# Patient Record
Sex: MALE
[De-identification: names, ages, dates, MRNs, and addresses within clinical notes are randomized per-mention and may not be internally consistent; named-entity substitution may affect disease eponyms.]

---

## 2017-09-15 ENCOUNTER — APPOINTMENT (OUTPATIENT)
Dept: ORTHOPEDIC SURGERY | Facility: CLINIC | Age: 82
End: 2017-09-15
Payer: MEDICARE

## 2017-09-15 VITALS — HEIGHT: 67 IN | WEIGHT: 153 LBS | BODY MASS INDEX: 24.01 KG/M2

## 2017-09-15 DIAGNOSIS — Z86.39 PERSONAL HISTORY OF OTHER ENDOCRINE, NUTRITIONAL AND METABOLIC DISEASE: ICD-10-CM

## 2017-09-15 DIAGNOSIS — Z86.69 PERSONAL HISTORY OF OTHER DISEASES OF THE NERVOUS SYSTEM AND SENSE ORGANS: ICD-10-CM

## 2017-09-15 DIAGNOSIS — Z80.9 FAMILY HISTORY OF MALIGNANT NEOPLASM, UNSPECIFIED: ICD-10-CM

## 2017-09-15 DIAGNOSIS — F19.90 OTHER PSYCHOACTIVE SUBSTANCE USE, UNSPECIFIED, UNCOMPLICATED: ICD-10-CM

## 2017-09-15 DIAGNOSIS — Z80.42 FAMILY HISTORY OF MALIGNANT NEOPLASM OF PROSTATE: ICD-10-CM

## 2017-09-15 DIAGNOSIS — Z87.39 PERSONAL HISTORY OF OTHER DISEASES OF THE MUSCULOSKELETAL SYSTEM AND CONNECTIVE TISSUE: ICD-10-CM

## 2017-09-15 PROBLEM — Z00.00 ENCOUNTER FOR PREVENTIVE HEALTH EXAMINATION: Status: ACTIVE | Noted: 2017-09-15

## 2017-09-15 PROCEDURE — 99213 OFFICE O/P EST LOW 20 MIN: CPT

## 2017-09-26 PROBLEM — F19.90 RARELY USES RECREATIONAL DRUG: Status: ACTIVE | Noted: 2017-09-15

## 2017-09-26 PROBLEM — Z80.9 FAMILY HISTORY OF MALIGNANT NEOPLASM: Status: ACTIVE | Noted: 2017-09-15

## 2017-09-26 PROBLEM — Z86.39 HISTORY OF HIGH CHOLESTEROL: Status: RESOLVED | Noted: 2017-09-15 | Resolved: 2017-09-26

## 2017-09-26 PROBLEM — Z80.42 FAMILY HISTORY OF MALIGNANT NEOPLASM OF PROSTATE: Status: ACTIVE | Noted: 2017-09-15

## 2017-09-26 PROBLEM — Z87.39 HISTORY OF HERNIATED INTERVERTEBRAL DISC: Status: RESOLVED | Noted: 2017-09-15 | Resolved: 2017-09-26

## 2017-09-26 PROBLEM — Z86.39 HISTORY OF DIABETES MELLITUS: Status: RESOLVED | Noted: 2017-09-15 | Resolved: 2017-09-26

## 2017-09-26 PROBLEM — Z86.69 HISTORY OF NEUROPATHY: Status: RESOLVED | Noted: 2017-09-15 | Resolved: 2017-09-26

## 2017-09-26 RX ORDER — LEVOTHYROXINE SODIUM 0.03 MG/1
25 TABLET ORAL
Qty: 25 | Refills: 0 | Status: ACTIVE | COMMUNITY
Start: 2017-01-31

## 2017-09-26 RX ORDER — MONTELUKAST 10 MG/1
10 TABLET, FILM COATED ORAL
Qty: 90 | Refills: 0 | Status: ACTIVE | COMMUNITY
Start: 2017-04-20

## 2017-09-26 RX ORDER — SILODOSIN 8 MG/1
8 CAPSULE ORAL
Qty: 90 | Refills: 0 | Status: ACTIVE | COMMUNITY
Start: 2016-09-07

## 2017-09-26 RX ORDER — FOLIC ACID 1 MG/1
1 TABLET ORAL
Refills: 0 | Status: ACTIVE | COMMUNITY

## 2017-09-26 RX ORDER — CICLOPIROX 80 MG/ML
8 SOLUTION TOPICAL
Qty: 66 | Refills: 0 | Status: ACTIVE | COMMUNITY
Start: 2017-03-21

## 2017-09-26 RX ORDER — LEVOTHYROXINE SODIUM 0.15 MG/1
150 TABLET ORAL
Qty: 90 | Refills: 0 | Status: ACTIVE | COMMUNITY
Start: 2017-03-02

## 2017-09-26 RX ORDER — VALACYCLOVIR 1 G/1
1 TABLET, FILM COATED ORAL
Refills: 0 | Status: ACTIVE | COMMUNITY

## 2017-09-26 RX ORDER — FAMOTIDINE 40 MG/1
40 TABLET, FILM COATED ORAL
Qty: 90 | Refills: 0 | Status: ACTIVE | COMMUNITY
Start: 2017-04-06

## 2017-09-26 RX ORDER — METFORMIN ER 500 MG 500 MG/1
500 TABLET ORAL
Qty: 90 | Refills: 0 | Status: ACTIVE | COMMUNITY
Start: 2017-09-07

## 2017-09-26 RX ORDER — VALACYCLOVIR 1 G/1
1 TABLET, FILM COATED ORAL
Qty: 30 | Refills: 0 | Status: ACTIVE | COMMUNITY
Start: 2017-02-22

## 2019-07-23 ENCOUNTER — APPOINTMENT (OUTPATIENT)
Dept: ORTHOPEDIC SURGERY | Facility: CLINIC | Age: 84
End: 2019-07-23
Payer: MEDICARE

## 2019-07-23 DIAGNOSIS — M75.42 IMPINGEMENT SYNDROME OF LEFT SHOULDER: ICD-10-CM

## 2019-07-23 PROCEDURE — 99214 OFFICE O/P EST MOD 30 MIN: CPT

## 2019-07-23 RX ORDER — MIRABEGRON 25 MG/1
25 TABLET, FILM COATED, EXTENDED RELEASE ORAL
Qty: 30 | Refills: 0 | Status: ACTIVE | COMMUNITY
Start: 2019-05-16

## 2019-07-23 RX ORDER — MIRABEGRON 50 MG/1
50 TABLET, FILM COATED, EXTENDED RELEASE ORAL
Qty: 90 | Refills: 0 | Status: ACTIVE | COMMUNITY
Start: 2019-05-24

## 2019-07-23 RX ORDER — ATORVASTATIN CALCIUM 10 MG/1
10 TABLET, FILM COATED ORAL
Qty: 90 | Refills: 0 | Status: DISCONTINUED | COMMUNITY
Start: 2016-10-10 | End: 2019-07-23

## 2019-07-23 RX ORDER — FINASTERIDE 5 MG/1
5 TABLET, FILM COATED ORAL
Qty: 90 | Refills: 0 | Status: ACTIVE | COMMUNITY
Start: 2019-02-11

## 2019-07-23 RX ORDER — ATORVASTATIN CALCIUM 20 MG/1
20 TABLET, FILM COATED ORAL
Qty: 90 | Refills: 0 | Status: ACTIVE | COMMUNITY
Start: 2019-03-01

## 2019-07-23 RX ORDER — OXYCODONE AND ACETAMINOPHEN 5; 325 MG/1; MG/1
5-325 TABLET ORAL
Qty: 30 | Refills: 0 | Status: DISCONTINUED | COMMUNITY
Start: 2017-07-05 | End: 2019-07-23

## 2019-07-23 RX ORDER — OMEPRAZOLE 20 MG/1
20 CAPSULE, DELAYED RELEASE ORAL
Qty: 180 | Refills: 0 | Status: DISCONTINUED | COMMUNITY
Start: 2018-07-17 | End: 2019-07-23

## 2019-07-23 RX ORDER — DOXYCYCLINE HYCLATE 100 MG/1
100 CAPSULE ORAL
Qty: 42 | Refills: 0 | Status: DISCONTINUED | COMMUNITY
Start: 2017-09-08 | End: 2019-07-23

## 2019-07-25 NOTE — PHYSICAL EXAM
[de-identified] : CONSTITUTIONAL: Patient is well-developed, well-nourished and appropriately groomed.\par SKIN: There are no rashes, no ulcers, and no café au lait spots in the upper extremities, face or cervical region.\par CARDIOVASCULAR: Both distal upper extremities are warm and the fingers have good capillary refill. Normal radial pulses bilaterally.\par RESPIRATORY: The patient is breathing normally and in no acute distress. \par HEMATOLOGICAL/LYMPHATIC: There is no lymphedema in either upper extremity. No cervical adenopathy, no axillary adenopathy.\par PSYCH: The patient is alert and oriented x 3;  appropriately groomed and dressed.\par NEUROLOGIC: Normal gait and station.\par MUSCULOSKELETAL:\par The left shoulder has 170° passive forward elevation, 70°, external rotation, and internal rotation to the 10th thoracic vertebra. He can fully actively raise the arm, but this is painful. Strength of external rotation is normal below shoulder level and he has no apprehension. In the shoulder. He has tenderness and crepitus in the subacromial region, a positive arc of pain, and a positive impingement sign, but no tenderness over the acromioclavicular joint.\par \par The right shoulder has full active and passive range of motion without pain. He has slight weakness of external rotation below shoulder level, but this is nonpainful.\par \par The cervical spine has full passive and active range of motion without pain. There is no focal tenderness in the paracervical muscles nor in the trapezius or parascapular muscles. Distally the motor and sensory exam is normal in both upper extremities.\par \par Distally, the hands are warm and well perfused with no signs of lymphedema or swelling. The radial pulse is present and normal.\par \par

## 2019-07-25 NOTE — HISTORY OF PRESENT ILLNESS
[de-identified] : Mr. Sam presents today complaining of pain in his nondominant left shoulder for one year without an injury. He has pain at night that interrupts his sleep and difficulty raising the arm for overhead activities of daily living. He currently takes no medicines for pain.\par \par His past orthopedic history is significant the fact that he underwent arthroscopic repair of a massive right rotator cuff tear. Under my care almost 3 years ago, from which he has had an excellent result. Prior to surgery. He was unable to raise the arm and had severe pain, and since surgery. He has full elevation with minimal pain.

## 2019-07-25 NOTE — DISCUSSION/SUMMARY
[Medication Risks Reviewed] : Medication risks reviewed [Surgical risks reviewed] : Surgical risks reviewed [de-identified] : Using a plastic shoulder model, I reviewed the anatomy of the rotator cuff muscles and the glenohumeral joint. I then explained the pathophysiology of impingement syndrome, commonly referred to as bursitis of the shoulder, and the fact that 80% of patients respond to nonoperative treatment.\par \par I then reviewed the nonoperative treatment program which includes activity modification to avoid those activities that exacerbate shoulder pain, Tylenol or anti-inflammatory medications as necessary, rehabilitation exercises focusing on shoulder stretching and rotator cuff muscle strengthening, either independently or with a physical therapist, and, a subacromial cortisone injection for those patients who remain symptomatic following an exercise program. \par \par Finally, I explained that those patients who remain symptomatic after a minimum of 3-4 months of nonoperative treatment and continue to have shoulder pain at night that interrupts sleep and compromises activities of everyday life or sporting activities requiring overhead use are candidates for surgical treatment.\par \par I then gave instructions in a home exercise program for the rotator cuff muscles emphasizing stretching and strengthening of the shoulder and also gave my home exercise sheet which includes illustrations of the exercises.\par He should return for my repeat evaluation in 6 weeks if he remains symptomatic at which time I would perform a subacromial cortisone injection and obtain an MRI of the left shoulder to rule out a full thickness tear of the rotator cuff.\par \par I explained to him that he has had an excellent result following arthroscopic repair of a massive right rotator cuff tear since he can fully elevate the arm and has only slight weakness of external rotation. The does not compromises activities.\par \par Today, his clinical left shoulder [employer] score is 76 on a scale of 100, indicating only fair shoulder function. His score on the postoperative right shoulder. Today is 90, indicating excellent shoulder function.\par \par

## 2020-11-20 ENCOUNTER — APPOINTMENT (OUTPATIENT)
Dept: ORTHOPEDIC SURGERY | Facility: CLINIC | Age: 85
End: 2020-11-20
Payer: MEDICARE

## 2020-11-20 DIAGNOSIS — M47.812 SPONDYLOSIS W/OUT MYELOPATHY OR RADICULOPATHY, CERVICAL REGION: ICD-10-CM

## 2020-11-20 DIAGNOSIS — S16.1XXA STRAIN OF MUSCLE, FASCIA AND TENDON AT NECK LEVEL, INITIAL ENCOUNTER: ICD-10-CM

## 2020-11-20 DIAGNOSIS — M75.101 UNSPECIFIED ROTATOR CUFF TEAR OR RUPTURE OF RIGHT SHOULDER, NOT SPECIFIED AS TRAUMATIC: ICD-10-CM

## 2020-11-20 PROCEDURE — 99212 OFFICE O/P EST SF 10 MIN: CPT
